# Patient Record
Sex: FEMALE | Race: WHITE | Employment: OTHER | URBAN - METROPOLITAN AREA
[De-identification: names, ages, dates, MRNs, and addresses within clinical notes are randomized per-mention and may not be internally consistent; named-entity substitution may affect disease eponyms.]

---

## 2023-10-17 ENCOUNTER — HOSPITAL ENCOUNTER (OUTPATIENT)
Dept: PHYSICAL THERAPY | Age: 68
Setting detail: THERAPIES SERIES
Discharge: HOME | End: 2023-10-17
Payer: MEDICARE

## 2023-10-17 PROCEDURE — 97161 PT EVAL LOW COMPLEX 20 MIN: CPT

## 2023-10-17 PROCEDURE — 97110 THERAPEUTIC EXERCISES: CPT

## 2023-10-17 ASSESSMENT — PAIN DESCRIPTION - ORIENTATION: ORIENTATION: RIGHT;INNER

## 2023-10-17 ASSESSMENT — PAIN DESCRIPTION - PAIN TYPE: TYPE: CHRONIC PAIN

## 2023-10-17 ASSESSMENT — PAIN SCALES - GENERAL: PAINLEVEL_OUTOF10: 1

## 2023-10-17 ASSESSMENT — PAIN DESCRIPTION - DESCRIPTORS: DESCRIPTORS: ACHING;SORE;SHARP

## 2023-10-17 ASSESSMENT — PAIN DESCRIPTION - LOCATION: LOCATION: ELBOW

## 2023-10-17 NOTE — PROGRESS NOTES
[address] [facility]  PHYSICAL THERAPY EVALUATION      Physical Therapy: Initial Evaluation    Patient: Gilles Marley (63 y.o.     female)   Examination Date: 10/17/2023   :  1955 ;    ConfirmedChuy Sweeney MRN: [id number]  CSN: [id number]   Insurance: Payor: MEDICARE / Plan: MEDICARE PART A AND B / Product Type: *No Product type* /   Insurance ID: [id number] - (Medicare)    Secondary Insurance (if applicable): [employer]    Referring Physician: Richelle Martínez MD       Visits to Date/Visits Approved: 1 /      No Show/Cancelled Appts: 0 / 0     Medical Diagnosis: Medial epicondylitis, right elbow [M77.01]  Segmental and somatic dysfunction of rib cage [M99.08]  Strain of muscle and tendon of unspecified wall of thorax, initial encounter [S29.019A]        Treatment Diagnosis: R ribcage pain, R elbow pain, decreased R shoulder strength, decreased R forearm mobility, decreased thoracolumbar AROM, decreased posture, generalized thoracic hypomobility with PMH of osteoporosis, and decreased activity tolerance     PERTINENT MEDICAL HISTORY   Patient Assessed for Rehabilitation Services: Yes       Medical History: Chart Reviewed: Yes No past medical history on file. Surgical History: No past surgical history on file. Medications: No current outpatient medications on file. Allergies: Patient has no allergy information on record. Imaging (if applicable): No results found. SUBJECTIVE EXAMINATION     History obtained from[de-identified] Patient, Chart Review,      Family/Caregiver Present: No    Subjective History:    Subjective: Pt reports she does aerobic exercise class with silver sneakers 3x per week and feels like ~3 months ago was doing a twisting exercise as well as using machines at the [facility] to assist in losing weight. Pt reprots while she was using weight machines she had increased back pain as well as R elbow pain.  Has since backed down her weights and has not done machines

## 2023-10-17 NOTE — PLAN OF CARE
[address] [facility]  PHYSICAL THERAPY PLAN OF CARE   [address] [facility] [place] Santoyo, [address] [facility]         Phone: [phone]  Fax: [phone]    [x] Certification  [] Recertification [x]  Plan of Care  [] Progress Note [] Discharge      Referring Provider: Melissa Rodriguez MD     From:  Inés Bernardo, PT, DPT  Patient: Jose R Roberts (89 y.o. female) : 1955 Date: 10/17/2023  Medical Diagnosis: Medial epicondylitis, right elbow [M77.01]  Segmental and somatic dysfunction of rib cage [M99.08]  Strain of muscle and tendon of unspecified wall of thorax, initial encounter [S29.019A]       Treatment Diagnosis: R ribcage pain, R elbow pain, decreased R shoulder strength, decreased R forearm mobility, decreased thoracolumbar AROM, decreased posture, generalized thoracic hypomobility with PMH of osteoporosis, and decreased activity tolerance    Plan of Care/Certification Expiration Date: 24   Progress Report Period from:  10/17/2023  to 10/17/2023    Visits to Date: 1 No Show: 0 Cancelled Appts: 0    OBJECTIVE:   Short Term Goals - Time Frame for Short Term Goals: 2-3 weeks    Goals Current/Discharge status  Status   Short Term Goal 1: The pt will demonstrate improved postural awareness requiring <25% VC's with exercises   Fair awareness; increased thoracic kyphosis, flattened lumbar lordosis    New     Long Term Goals - Time Frame for Long Term Goals : 5 weeks  Goals Current/ Discharge status Status   Long Term Goal 1: The pt will be indep/compliant with HEP in order to self-manage symptoms upon D/C  ongoing   New   Long Term Goal 2: The pt will have an UEFI score >/=75/80 and CASSANDRA score 0/50 points in order to increase functional activity tolerance  Exam: UEFI 69/80 CASSANDRA 5/50   New   Long Term Goal 3: The pt will demo improved R wrist AROM >/=10* ea. plane in order to decrease R elbow pain with lifting activities AROM RUE (degrees)  R Elbow Flexion (0-145): WFL  R Elbow Extension (145-0):

## 2023-10-19 ENCOUNTER — HOSPITAL ENCOUNTER (OUTPATIENT)
Dept: PHYSICAL THERAPY | Age: 68
Setting detail: THERAPIES SERIES
Discharge: HOME | End: 2023-10-19
Payer: MEDICARE

## 2023-10-19 PROCEDURE — 97110 THERAPEUTIC EXERCISES: CPT

## 2023-10-19 NOTE — PROGRESS NOTES
self-manage symptoms upon D/C In progress   LTG 2 The pt will have an UEFI score >/=75/80 and CASSANDRA score 0/50 points in order to increase functional activity tolerance In progress   LTG 3 The pt will demo improved R wrist AROM >/=10* ea. plane in order to decrease R elbow pain with lifting activities In progress   LTG 4 The pt will demonstrate improved R shoulder strength >/=4+/5 and periscpaular strength >/=4/5 in order to increase UE biomechanics and lift/carry objects with decreased pain In progress   LTG 5 The pt will demo improved thoraoclumbar AROM >/=25% ea. plane in order to increase ease with performing daily twisting activities In progress            Plan:  Frequency/Duration:  Plan  Plan Frequency: 2  Plan weeks: 5  Current Treatment Recommendations: Strengthening, ROM, Neuromuscular re-education, Manual, Pain management, Home exercise program, Patient/Caregiver education & training, Modalities, Dry needling  Modalities: Heat/Cold, Ultrasound, E-stim - unattended  Pt to continue current HEP. See objective section for any therapeutic exercise changes, additions or modifications this date.     Therapy Time:      PT Individual Minutes  Time In: 9486  Time Out: 1274  Minutes: 44  Timed Code Treatment Minutes: 44 Minutes  Procedure Minutes: 0  Timed Activity Minutes Units   Ther Ex 44 3     Electronically signed by Asuncion Rodriguez PTA on 10/19/23 at 1:26 PM EDT

## 2023-10-24 ENCOUNTER — HOSPITAL ENCOUNTER (OUTPATIENT)
Dept: PHYSICAL THERAPY | Age: 68
Setting detail: THERAPIES SERIES
Discharge: HOME | End: 2023-10-24
Payer: MEDICARE

## 2023-10-24 NOTE — PROGRESS NOTES
Therapy                            Cancellation/No-show Note      Date: 10/24/2023  Patient: [place] (72 y.o. female)  : 1955  MRN:  [id number]  Referring Physician: Gail Weston MD    Medical Diagnosis: Medial epicondylitis, right elbow [M77.01]  Segmental and somatic dysfunction of rib cage [M99.08]  Strain of muscle and tendon of unspecified wall of thorax, initial encounter [S29.019A]      Visit Information:  Visits to Date 2   No Show/Cancelled Appts: 0       For today's appointment patient:  [x]  Cancelled  []  Rescheduled appointment  []  No-show   []  Called pt to remind of next appointment     Reason given by patient:  [x]  Patient ill  []  Conflicting appointment  []  No transportation    []  Conflict with work  []  No reason given  []  Other:      [x] Pt has future appointments scheduled, no follow up needed  [] Pt requests to be on hold.     Reason:   If > 2 weeks please discuss with therapist.  [] Therapist to call pt for follow up     Comments:       Signature: Electronically signed by Amadou Michelle PTA on 10/24/23 at 9:56 AM EDT

## 2023-10-26 ENCOUNTER — HOSPITAL ENCOUNTER (OUTPATIENT)
Dept: PHYSICAL THERAPY | Age: 68
Setting detail: THERAPIES SERIES
Discharge: HOME | End: 2023-10-26
Payer: MEDICARE

## 2023-10-26 NOTE — PROGRESS NOTES
Therapy                            Cancellation/No-show Note      Date: 10/26/2023  Patient: Paige Collins (74 y.o. female)  : 1955  MRN:  [id number]  Referring Physician: Prashanth Vlila MD    Medical Diagnosis: Medial epicondylitis, right elbow [M77.01]  Segmental and somatic dysfunction of rib cage [M99.08]  Strain of muscle and tendon of unspecified wall of thorax, initial encounter [S29.019A]      Visit Information:  Visits to Date 2   No Show/Cancelled Appts: 0       For today's appointment patient:  [x]  Cancelled  []  Rescheduled appointment  []  No-show   []  Called pt to remind of next appointment     Reason given by patient:  [x]  Patient ill  []  Conflicting appointment  []  No transportation    []  Conflict with work  []  No reason given  []  Other:      [x] Pt has future appointments scheduled, no follow up needed  [] Pt requests to be on hold.     Reason:   If > 2 weeks please discuss with therapist.  [] Therapist to call pt for follow up     Comments:       Signature: Electronically signed by Rosio Cortes PTA on 10/26/23 at 10:13 AM EDT

## 2023-10-31 ENCOUNTER — HOSPITAL ENCOUNTER (OUTPATIENT)
Dept: PHYSICAL THERAPY | Age: 68
Setting detail: THERAPIES SERIES
Discharge: HOME | End: 2023-10-31
Payer: MEDICARE

## 2023-10-31 PROCEDURE — 97110 THERAPEUTIC EXERCISES: CPT

## 2023-10-31 ASSESSMENT — PAIN SCALES - GENERAL: PAINLEVEL_OUTOF10: 4

## 2023-10-31 ASSESSMENT — PAIN DESCRIPTION - PAIN TYPE: TYPE: CHRONIC PAIN

## 2023-10-31 ASSESSMENT — PAIN DESCRIPTION - LOCATION: LOCATION: ELBOW

## 2023-10-31 ASSESSMENT — PAIN DESCRIPTION - ORIENTATION: ORIENTATION: RIGHT;INNER;MID

## 2023-10-31 ASSESSMENT — PAIN DESCRIPTION - DESCRIPTORS: DESCRIPTORS: ACHING;SORE;SHARP

## 2023-10-31 NOTE — PROGRESS NOTES
score >/=75/80 and CASSANDRA score 0/50 points in order to increase functional activity tolerance In progress   LTG 3 The pt will demo improved R wrist AROM >/=10* ea. plane in order to decrease R elbow pain with lifting activities In progress   LTG 4 The pt will demonstrate improved R shoulder strength >/=4+/5 and periscpaular strength >/=4/5 in order to increase UE biomechanics and lift/carry objects with decreased pain In progress   LTG 5 The pt will demo improved thoraoclumbar AROM >/=25% ea. plane in order to increase ease with performing daily twisting activities Met          Plan:  Frequency/Duration:  Plan  Plan Frequency: 2  Plan weeks: 5  Current Treatment Recommendations: Strengthening, ROM, Neuromuscular re-education, Manual, Pain management, Home exercise program, Patient/Caregiver education & training, Modalities, Dry needling  Modalities: Heat/Cold, Ultrasound, E-stim - unattended  Pt to continue current HEP. See objective section for any therapeutic exercise changes, additions or modifications this date.     Therapy Time:      PT Individual Minutes  Time In: 9669  Time Out: 3459  Minutes: 40  Timed Code Treatment Minutes: 40 Minutes  Procedure Minutes:0    Timed Activity Minutes Units   Ther Ex 40 3     Electronically signed by Kelly Michelle PTA on 10/31/23 at 12:48 PM EDT

## 2023-11-02 ENCOUNTER — HOSPITAL ENCOUNTER (OUTPATIENT)
Dept: PHYSICAL THERAPY | Age: 68
Setting detail: THERAPIES SERIES
Discharge: HOME | End: 2023-11-02
Payer: MEDICARE

## 2023-11-02 PROCEDURE — 97110 THERAPEUTIC EXERCISES: CPT

## 2023-11-02 ASSESSMENT — PAIN DESCRIPTION - PAIN TYPE: TYPE: CHRONIC PAIN

## 2023-11-02 ASSESSMENT — PAIN SCALES - GENERAL: PAINLEVEL_OUTOF10: 2

## 2023-11-02 ASSESSMENT — PAIN DESCRIPTION - ORIENTATION: ORIENTATION: RIGHT;INNER;MID

## 2023-11-02 ASSESSMENT — PAIN DESCRIPTION - LOCATION: LOCATION: ELBOW

## 2023-11-02 NOTE — PROGRESS NOTES
wrist AROM >/=10* ea. plane in order to decrease R elbow pain with lifting activities In progress   LTG 4 The pt will demonstrate improved R shoulder strength >/=4+/5 and periscpaular strength >/=4/5 in order to increase UE biomechanics and lift/carry objects with decreased pain In progress   LTG 5 The pt will demo improved thoraoclumbar AROM >/=25% ea. plane in order to increase ease with performing daily twisting activities Met          Plan:  Frequency/Duration:  Plan  Plan Frequency: 2  Plan weeks: 5  Current Treatment Recommendations: Strengthening, ROM, Neuromuscular re-education, Manual, Pain management, Home exercise program, Patient/Caregiver education & training, Modalities, Dry needling  Modalities: Heat/Cold, Ultrasound, E-stim - unattended  Pt to continue current HEP. See objective section for any therapeutic exercise changes, additions or modifications this date.     Therapy Time:      PT Individual Minutes  Time In: 1330  Time Out: 3151  Minutes: 41  Timed Code Treatment Minutes: 41 Minutes  Procedure Minutes: 0  Timed Activity Minutes Units   Ther Ex 41 3   Electronically signed by Magen Schultz PTA on 11/2/23 at 2:11 PM EDT

## 2023-11-06 ENCOUNTER — HOSPITAL ENCOUNTER (OUTPATIENT)
Dept: PHYSICAL THERAPY | Age: 68
Setting detail: THERAPIES SERIES
Discharge: HOME | End: 2023-11-06
Payer: MEDICARE

## 2023-11-06 PROCEDURE — 97110 THERAPEUTIC EXERCISES: CPT

## 2023-11-06 ASSESSMENT — PAIN DESCRIPTION - PAIN TYPE: TYPE: CHRONIC PAIN

## 2023-11-06 ASSESSMENT — PAIN DESCRIPTION - DESCRIPTORS: DESCRIPTORS: SORE;ACHING

## 2023-11-06 ASSESSMENT — PAIN DESCRIPTION - LOCATION: LOCATION: ELBOW

## 2023-11-06 ASSESSMENT — PAIN DESCRIPTION - ORIENTATION: ORIENTATION: RIGHT;INNER;MID

## 2023-11-06 ASSESSMENT — PAIN SCALES - GENERAL: PAINLEVEL_OUTOF10: 2

## 2023-11-06 NOTE — PROGRESS NOTES
continue with putty ex's and wrist mobility        *Indicates exercise, modality, or manual techniques to be initiated when appropriate    Objective Measures:      LTG 5 Current Status[de-identified] 10/31/23 flex, ext, B SB, B rot 100% WNL pain-free        Assessment: Body Structures, Functions, Activity Limitations Requiring Skilled Therapeutic Intervention: Decreased ROM, Decreased strength, Decreased high-level IADLs, Increased pain, Decreased posture  Assessment: Progressed exercises with good results. Burgess Hunt continues to demonstrate good strength and motivation. Increased cuing to work within tolerable ranges with fair carryover. Introduced new activities for strength with good results. Burgess Hunt reported no increase in pain with activities. Progress per POC. Treatment Diagnosis: R ribcage pain, R elbow pain, decreased R shoulder strength, decreased R forearm mobility, decreased thoracolumbar AROM, decreased posture, generalized thoracic hypomobility with PMH of osteoporosis, and decreased activity tolerance  Therapy Prognosis: Good       Patient Education: [x] NA       Post-Pain Assessment:       Pain Rating (0-10 pain scale):   0/10   Location and pain description same as pre-treatment unless indicated.    Action: [] NA   [x] Perform HEP  [] Meds as prescribed  [] Modalities as prescribed   [] Call Physician     GOALS   Patient Goal(s): Patient Goals : \"decrease swelling, increase movement\"    Short Term Goals Completed by 2-3 weeks Goal Status   STG 1 The pt will demonstrate improved postural awareness requiring <25% VC's with exercises In progress        Long Term Goals Completed by 5 weeks Goal Status   LTG 1 The pt will be indep/compliant with HEP in order to self-manage symptoms upon D/C In progress   LTG 2 The pt will have an UEFI score >/=75/80 and CASSANDRA score 0/50 points in order to increase functional activity tolerance In progress   LTG 3 The pt will demo improved R wrist AROM >/=10* ea. plane in order to

## 2023-11-09 ENCOUNTER — HOSPITAL ENCOUNTER (OUTPATIENT)
Dept: PHYSICAL THERAPY | Age: 68
Setting detail: THERAPIES SERIES
Discharge: HOME | End: 2023-11-09
Payer: MEDICARE

## 2023-11-09 PROCEDURE — 97110 THERAPEUTIC EXERCISES: CPT

## 2023-11-09 NOTE — PROGRESS NOTES
[address] [facility]  PHYSICAL THERAPY PLAN OF CARE   [address] [facility] [place]ana Santoyo, [address] [facility]         Phone: [phone]  Fax: [phone]    [] Certification  [] Recertification []  Plan of Care  [x] Progress Note [] Discharge      Referring Provider: Nithin Levine MD     From:  Grabiel Vasquez, PT, DPT  Patient: Yovana Adame (54 y.o. female) : 1955 Date: 2023  Medical Diagnosis: Medial epicondylitis, right elbow [M77.01]  Segmental and somatic dysfunction of rib cage [M99.08]  Strain of muscle and tendon of unspecified wall of thorax, initial encounter [S29.019A]       Treatment Diagnosis: R ribcage pain, R elbow pain, decreased R shoulder strength, decreased R forearm mobility, decreased thoracolumbar AROM, decreased posture, generalized thoracic hypomobility with PMH of osteoporosis, and decreased activity tolerance    Plan of Care/Certification Expiration Date: 24   Progress Report Period from:  2023  to 2023    Visits to Date: 5 No Show: 0 Cancelled Appts: 2    OBJECTIVE:   Short Term Goals - Time Frame for Short Term Goals: 2-3 weeks    Goals Current/Discharge status  Status   Short Term Goal 1: The pt will demonstrate improved postural awareness requiring <25% VC's with exercises  STG 1 Current Status[de-identified] 23: Good postural awareness noted and pt reports good awareness with performing \"intentional\" movements during exercise classes   Met     Long Term Goals - Time Frame for Long Term Goals : 5 weeks  Goals Current/ Discharge status Status   Long Term Goal 1: The pt will be indep/compliant with HEP in order to self-manage symptoms upon D/C LTG 1 Current Status[de-identified] 23: Pt reports good compliance with HEP and sliver sneakers program 3x per week   Met   Long Term Goal 2: The pt will have an UEFI score >/=75/80 and CASSANDRA score 0/50 points in order to increase functional activity tolerance LTG 2 Current Status[de-identified] 23: UEFI 78/80 CASSANDRA 0/50   Met   Long Term

## 2023-11-09 NOTE — PROGRESS NOTES
[address] [facility]  Outpatient Physical Therapy    Treatment Note        Date: 2023  Patient: Negin Zaragoza  : 1955   Confirmed: Yes  MRN: [id number]  Referring Provider: Demetrice Wall MD    Medical Diagnosis: Medial epicondylitis, right elbow [M77.01]  Segmental and somatic dysfunction of rib cage [M99.08]  Strain of muscle and tendon of unspecified wall of thorax, initial encounter [S29.019A]       Treatment Diagnosis: R ribcage pain, R elbow pain, decreased R shoulder strength, decreased R forearm mobility, decreased thoracolumbar AROM, decreased posture, generalized thoracic hypomobility with PMH of osteoporosis, and decreased activity tolerance    Visit Information:  Insurance: Payor: Leobardo Coad / Plan: MEDICARE PART A AND B / Product Type: *No Product type* /   PT Visit Information  Total # of Visits to Date: 5  Plan of Care/Certification Expiration Date: 24  No Show: 0  Progress Note Due Date: 23  Canceled Appointment: 2  Progress Note Counter: 5/10 (PN due 23)    Subjective Information:  Subjective: Pt reports \"doing very well\" increased ease with lifitng and twisting movements. Pt reports having minimal to no pain and significant decrease in \"pressure\" feeling. Intermittent difficulty with pulling UE to side/bracing. Pt denies any back pain recently.  Reprots feelin >/=90% functional.  HEP Compliance:  [x] Good [] Fair [] Poor [] Reports not doing due to:    Pain Screening  Patient Currently in Pain: Denies    Treatment:  Exercises:  Exercises  Exercise 1: UBE 2.5/2.5 F/R L1.0  Exercise 2: naya twist Green bar, \"U\" Green, upside down \"U\" Green 1x10, 1x15  Exercise 5: rows/lats GTB 2 x 15 ea  Exercise 7: periscapular strengthening: prone rows/lats 2#x15, horiz abd IR/ER x15, low trap L1 x10  Exercise 20: HEP: periscapular ex, tband rows/lats  Treatment Reasoning  Limitations addressed: Strength    *Indicates exercise, modality, or manual techniques to be

## 2023-11-14 ENCOUNTER — APPOINTMENT (OUTPATIENT)
Dept: PHYSICAL THERAPY | Age: 68
End: 2023-11-14
Payer: MEDICARE

## 2023-11-16 ENCOUNTER — APPOINTMENT (OUTPATIENT)
Dept: PHYSICAL THERAPY | Age: 68
End: 2023-11-16
Payer: MEDICARE